# Patient Record
Sex: FEMALE | Race: WHITE | NOT HISPANIC OR LATINO | URBAN - METROPOLITAN AREA
[De-identification: names, ages, dates, MRNs, and addresses within clinical notes are randomized per-mention and may not be internally consistent; named-entity substitution may affect disease eponyms.]

---

## 2017-08-06 RX ORDER — CARBAMIDE PEROXIDE 81.86 MG/ML
0 SOLUTION/ DROPS AURICULAR (OTIC)
Qty: 0 | Refills: 0 | COMMUNITY
Start: 2017-08-06

## 2017-08-07 ENCOUNTER — INPATIENT (INPATIENT)
Facility: HOSPITAL | Age: 57
LOS: 0 days | Discharge: HOME | DRG: 310 | End: 2017-08-08
Attending: INTERNAL MEDICINE | Admitting: INTERNAL MEDICINE
Payer: MEDICAID

## 2017-08-07 VITALS
HEART RATE: 106 BPM | DIASTOLIC BLOOD PRESSURE: 82 MMHG | OXYGEN SATURATION: 98 % | WEIGHT: 253.53 LBS | SYSTOLIC BLOOD PRESSURE: 146 MMHG | RESPIRATION RATE: 16 BRPM

## 2017-08-07 DIAGNOSIS — I48.91 UNSPECIFIED ATRIAL FIBRILLATION: ICD-10-CM

## 2017-08-07 LAB
ALBUMIN SERPL ELPH-MCNC: 4.6 G/DL — SIGNIFICANT CHANGE UP (ref 3.3–5)
ALP SERPL-CCNC: 61 U/L — SIGNIFICANT CHANGE UP (ref 40–120)
ALT FLD-CCNC: 33 U/L RC — SIGNIFICANT CHANGE UP (ref 10–45)
ANION GAP SERPL CALC-SCNC: 15 MMOL/L — SIGNIFICANT CHANGE UP (ref 5–17)
APTT BLD: 32.1 SEC — SIGNIFICANT CHANGE UP (ref 27.5–37.4)
AST SERPL-CCNC: 28 U/L — SIGNIFICANT CHANGE UP (ref 10–40)
BASOPHILS # BLD AUTO: 0 K/UL — SIGNIFICANT CHANGE UP (ref 0–0.2)
BASOPHILS NFR BLD AUTO: 0.3 % — SIGNIFICANT CHANGE UP (ref 0–2)
BILIRUB SERPL-MCNC: 0.9 MG/DL — SIGNIFICANT CHANGE UP (ref 0.2–1.2)
BUN SERPL-MCNC: 7 MG/DL — SIGNIFICANT CHANGE UP (ref 7–23)
CALCIUM SERPL-MCNC: 9.1 MG/DL — SIGNIFICANT CHANGE UP (ref 8.4–10.5)
CHLORIDE SERPL-SCNC: 102 MMOL/L — SIGNIFICANT CHANGE UP (ref 96–108)
CK MB BLD-MCNC: 2.5 % — SIGNIFICANT CHANGE UP (ref 0–3.5)
CK MB CFR SERPL CALC: 1.4 NG/ML — SIGNIFICANT CHANGE UP (ref 0–3.8)
CK MB CFR SERPL CALC: 1.5 NG/ML — SIGNIFICANT CHANGE UP (ref 0–3.8)
CK SERPL-CCNC: 57 U/L — SIGNIFICANT CHANGE UP (ref 25–170)
CK SERPL-CCNC: 65 U/L — SIGNIFICANT CHANGE UP (ref 25–170)
CO2 SERPL-SCNC: 25 MMOL/L — SIGNIFICANT CHANGE UP (ref 22–31)
CREAT SERPL-MCNC: 0.74 MG/DL — SIGNIFICANT CHANGE UP (ref 0.5–1.3)
EOSINOPHIL # BLD AUTO: 0 K/UL — SIGNIFICANT CHANGE UP (ref 0–0.5)
EOSINOPHIL NFR BLD AUTO: 0.2 % — SIGNIFICANT CHANGE UP (ref 0–6)
GLUCOSE SERPL-MCNC: 156 MG/DL — HIGH (ref 70–99)
HCT VFR BLD CALC: 46 % — HIGH (ref 34.5–45)
HGB BLD-MCNC: 16.3 G/DL — HIGH (ref 11.5–15.5)
INR BLD: 1.05 RATIO — SIGNIFICANT CHANGE UP (ref 0.88–1.16)
LYMPHOCYTES # BLD AUTO: 1.5 K/UL — SIGNIFICANT CHANGE UP (ref 1–3.3)
LYMPHOCYTES # BLD AUTO: 15.5 % — SIGNIFICANT CHANGE UP (ref 13–44)
MCHC RBC-ENTMCNC: 31.8 PG — SIGNIFICANT CHANGE UP (ref 27–34)
MCHC RBC-ENTMCNC: 35.5 GM/DL — SIGNIFICANT CHANGE UP (ref 32–36)
MCV RBC AUTO: 89.4 FL — SIGNIFICANT CHANGE UP (ref 80–100)
MONOCYTES # BLD AUTO: 0.5 K/UL — SIGNIFICANT CHANGE UP (ref 0–0.9)
MONOCYTES NFR BLD AUTO: 5.2 % — SIGNIFICANT CHANGE UP (ref 2–14)
NEUTROPHILS # BLD AUTO: 7.5 K/UL — HIGH (ref 1.8–7.4)
NEUTROPHILS NFR BLD AUTO: 78.8 % — HIGH (ref 43–77)
PLATELET # BLD AUTO: 233 K/UL — SIGNIFICANT CHANGE UP (ref 150–400)
POTASSIUM SERPL-MCNC: 3.9 MMOL/L — SIGNIFICANT CHANGE UP (ref 3.5–5.3)
POTASSIUM SERPL-SCNC: 3.9 MMOL/L — SIGNIFICANT CHANGE UP (ref 3.5–5.3)
PROT SERPL-MCNC: 8.2 G/DL — SIGNIFICANT CHANGE UP (ref 6–8.3)
PROTHROM AB SERPL-ACNC: 11.4 SEC — SIGNIFICANT CHANGE UP (ref 9.8–12.7)
RBC # BLD: 5.14 M/UL — SIGNIFICANT CHANGE UP (ref 3.8–5.2)
RBC # FLD: 11.4 % — SIGNIFICANT CHANGE UP (ref 10.3–14.5)
SODIUM SERPL-SCNC: 142 MMOL/L — SIGNIFICANT CHANGE UP (ref 135–145)
TROPONIN T SERPL-MCNC: <0.01 NG/ML — SIGNIFICANT CHANGE UP (ref 0–0.06)
TROPONIN T SERPL-MCNC: <0.01 NG/ML — SIGNIFICANT CHANGE UP (ref 0–0.06)
TSH SERPL-MCNC: 1.63 UIU/ML — SIGNIFICANT CHANGE UP (ref 0.27–4.2)
WBC # BLD: 9.5 K/UL — SIGNIFICANT CHANGE UP (ref 3.8–10.5)
WBC # FLD AUTO: 9.5 K/UL — SIGNIFICANT CHANGE UP (ref 3.8–10.5)

## 2017-08-07 PROCEDURE — 99285 EMERGENCY DEPT VISIT HI MDM: CPT

## 2017-08-07 RX ORDER — DEXTROSE 50 % IN WATER 50 %
1 SYRINGE (ML) INTRAVENOUS ONCE
Qty: 0 | Refills: 0 | Status: DISCONTINUED | OUTPATIENT
Start: 2017-08-07 | End: 2017-08-08

## 2017-08-07 RX ORDER — HEPARIN SODIUM 5000 [USP'U]/ML
INJECTION INTRAVENOUS; SUBCUTANEOUS
Qty: 25000 | Refills: 0 | Status: DISCONTINUED | OUTPATIENT
Start: 2017-08-07 | End: 2017-08-08

## 2017-08-07 RX ORDER — GLUCAGON INJECTION, SOLUTION 0.5 MG/.1ML
1 INJECTION, SOLUTION SUBCUTANEOUS ONCE
Qty: 0 | Refills: 0 | Status: DISCONTINUED | OUTPATIENT
Start: 2017-08-07 | End: 2017-08-08

## 2017-08-07 RX ORDER — HEPARIN SODIUM 5000 [USP'U]/ML
5000 INJECTION INTRAVENOUS; SUBCUTANEOUS ONCE
Qty: 0 | Refills: 0 | Status: DISCONTINUED | OUTPATIENT
Start: 2017-08-07 | End: 2017-08-07

## 2017-08-07 RX ORDER — SODIUM CHLORIDE 9 MG/ML
1000 INJECTION, SOLUTION INTRAVENOUS
Qty: 0 | Refills: 0 | Status: DISCONTINUED | OUTPATIENT
Start: 2017-08-07 | End: 2017-08-08

## 2017-08-07 RX ORDER — HEPARIN SODIUM 5000 [USP'U]/ML
4500 INJECTION INTRAVENOUS; SUBCUTANEOUS EVERY 6 HOURS
Qty: 0 | Refills: 0 | Status: DISCONTINUED | OUTPATIENT
Start: 2017-08-07 | End: 2017-08-08

## 2017-08-07 RX ORDER — DEXTROSE 50 % IN WATER 50 %
25 SYRINGE (ML) INTRAVENOUS ONCE
Qty: 0 | Refills: 0 | Status: DISCONTINUED | OUTPATIENT
Start: 2017-08-07 | End: 2017-08-08

## 2017-08-07 RX ORDER — INSULIN LISPRO 100/ML
VIAL (ML) SUBCUTANEOUS
Qty: 0 | Refills: 0 | Status: DISCONTINUED | OUTPATIENT
Start: 2017-08-07 | End: 2017-08-08

## 2017-08-07 RX ORDER — DEXTROSE 50 % IN WATER 50 %
12.5 SYRINGE (ML) INTRAVENOUS ONCE
Qty: 0 | Refills: 0 | Status: DISCONTINUED | OUTPATIENT
Start: 2017-08-07 | End: 2017-08-08

## 2017-08-07 RX ORDER — HEPARIN SODIUM 5000 [USP'U]/ML
INJECTION INTRAVENOUS; SUBCUTANEOUS
Qty: 25000 | Refills: 0 | Status: DISCONTINUED | OUTPATIENT
Start: 2017-08-07 | End: 2017-08-07

## 2017-08-07 RX ORDER — HEPARIN SODIUM 5000 [USP'U]/ML
9000 INJECTION INTRAVENOUS; SUBCUTANEOUS ONCE
Qty: 0 | Refills: 0 | Status: COMPLETED | OUTPATIENT
Start: 2017-08-07 | End: 2017-08-07

## 2017-08-07 RX ORDER — HEPARIN SODIUM 5000 [USP'U]/ML
9000 INJECTION INTRAVENOUS; SUBCUTANEOUS EVERY 6 HOURS
Qty: 0 | Refills: 0 | Status: DISCONTINUED | OUTPATIENT
Start: 2017-08-07 | End: 2017-08-08

## 2017-08-07 RX ORDER — ASPIRIN/CALCIUM CARB/MAGNESIUM 324 MG
162 TABLET ORAL ONCE
Qty: 0 | Refills: 0 | Status: DISCONTINUED | OUTPATIENT
Start: 2017-08-07 | End: 2017-08-07

## 2017-08-07 RX ORDER — INSULIN LISPRO 100/ML
VIAL (ML) SUBCUTANEOUS AT BEDTIME
Qty: 0 | Refills: 0 | Status: DISCONTINUED | OUTPATIENT
Start: 2017-08-07 | End: 2017-08-08

## 2017-08-07 RX ORDER — ASPIRIN/CALCIUM CARB/MAGNESIUM 324 MG
162 TABLET ORAL ONCE
Qty: 0 | Refills: 0 | Status: COMPLETED | OUTPATIENT
Start: 2017-08-07 | End: 2017-08-07

## 2017-08-07 RX ORDER — HEPARIN SODIUM 5000 [USP'U]/ML
6000 INJECTION INTRAVENOUS; SUBCUTANEOUS EVERY 6 HOURS
Qty: 0 | Refills: 0 | Status: DISCONTINUED | OUTPATIENT
Start: 2017-08-07 | End: 2017-08-07

## 2017-08-07 RX ORDER — LOSARTAN POTASSIUM 100 MG/1
25 TABLET, FILM COATED ORAL DAILY
Qty: 0 | Refills: 0 | Status: DISCONTINUED | OUTPATIENT
Start: 2017-08-07 | End: 2017-08-08

## 2017-08-07 RX ADMIN — Medication 162 MILLIGRAM(S): at 14:40

## 2017-08-07 RX ADMIN — HEPARIN SODIUM 9000 UNIT(S): 5000 INJECTION INTRAVENOUS; SUBCUTANEOUS at 19:55

## 2017-08-07 RX ADMIN — HEPARIN SODIUM 2000 UNIT(S)/HR: 5000 INJECTION INTRAVENOUS; SUBCUTANEOUS at 19:56

## 2017-08-07 NOTE — H&P ADULT - NSHPLABSRESULTS_GEN_ALL_CORE
16.3   9.5   )-----------( 233      ( 07 Aug 2017 14:22 )             46.0       08-07    142  |  102  |  7   ----------------------------<  156<H>  3.9   |  25  |  0.74    Ca    9.1      07 Aug 2017 14:22    TPro  8.2  /  Alb  4.6  /  TBili  0.9  /  DBili  x   /  AST  28  /  ALT  33  /  AlkPhos  61  08-07                  PT/INR - ( 07 Aug 2017 17:23 )   PT: 11.4 sec;   INR: 1.05 ratio         PTT - ( 07 Aug 2017 17:23 )  PTT:32.1 sec    Lactate Trend      CARDIAC MARKERS ( 07 Aug 2017 17:29 )  x     / <0.01 ng/mL / 57 U/L / x     / 1.4 ng/mL  CARDIAC MARKERS ( 07 Aug 2017 14:22 )  x     / <0.01 ng/mL / 65 U/L / x     / 1.5 ng/mL        CAPILLARY BLOOD GLUCOSE  163 (07 Aug 2017 21:28)            EKG SR Tinv iii/F                          16.3   9.5   )-----------( 233      ( 07 Aug 2017 14:22 )             46.0       08-07    142  |  102  |  7   ----------------------------<  156<H>  3.9   |  25  |  0.74    Ca    9.1      07 Aug 2017 14:22    TPro  8.2  /  Alb  4.6  /  TBili  0.9  /  DBili  x   /  AST  28  /  ALT  33  /  AlkPhos  61  08-07                  PT/INR - ( 07 Aug 2017 17:23 )   PT: 11.4 sec;   INR: 1.05 ratio         PTT - ( 07 Aug 2017 17:23 )  PTT:32.1 sec    Lactate Trend      CARDIAC MARKERS ( 07 Aug 2017 17:29 )  x     / <0.01 ng/mL / 57 U/L / x     / 1.4 ng/mL  CARDIAC MARKERS ( 07 Aug 2017 14:22 )  x     / <0.01 ng/mL / 65 U/L / x     / 1.5 ng/mL        CAPILLARY BLOOD GLUCOSE  163 (07 Aug 2017 21:28)

## 2017-08-07 NOTE — CONSULT NOTE ADULT - SUBJECTIVE AND OBJECTIVE BOX
CHIEF COMPLAINT: afib    HISTORY OF PRESENT ILLNESS:  This is a pleasant 57 f with htn, Dm, went to her pmd and card office found to be in rapid afib  she denies any chest pain, sob, palpitation, dizziness or syncope.     PAST MEDICAL & SURGICAL HISTORY:  DM (diabetes mellitus)          MEDICATIONS:      reviewed             FAMILY HISTORY:      Non-contributory    SOCIAL HISTORY:    No tobacco, drugs or etoh    Allergies    cortisone (Swelling)  predniSONE (Swelling)  tetracycline (Swelling)    Intolerances    	    REVIEW OF SYSTEMS:  as above  The rest of the 14 points ROS reviewed and except above they are unremarkable.        PHYSICAL EXAM:  T(C): 36.6 (08-07-17 @ 17:28), Max: 36.9 (08-07-17 @ 13:34)  HR: 78 (08-07-17 @ 17:28) (78 - 127)  BP: 158/78 (08-07-17 @ 17:28) (133/79 - 158/78)  RR: 18 (08-07-17 @ 17:28) (16 - 18)  SpO2: 96% (08-07-17 @ 17:28) (95% - 98%)  Wt(kg): --  I&O's Summary      Appearance: Normal	  HEENT:   Normal oral mucosa, PERRL, EOMI	  Cardiovascular: Normal S1 S2,    Murmur:   Neck: JVP normal  Respiratory: Lungs clear to auscultation  Gastrointestinal:  Soft, Non-tender, + BS	  Skin: normal   Neuro: No gross deficits.   Psychiatry:  Mood & affect appropriate  Ext: No edema    TELEMETRY: 	    ECG:  	  RADIOLOGY:  OTHER: 	  	  LABS:	 	    CARDIAC MARKERS:  Troponin T, Serum: <0.01 ng/mL (08-07 @ 17:29)  Troponin T, Serum: <0.01 ng/mL (08-07 @ 14:22)                                  16.3   9.5   )-----------( 233      ( 07 Aug 2017 14:22 )             46.0     08-07    142  |  102  |  7   ----------------------------<  156<H>  3.9   |  25  |  0.74    Ca    9.1      07 Aug 2017 14:22    TPro  8.2  /  Alb  4.6  /  TBili  0.9  /  DBili  x   /  AST  28  /  ALT  33  /  AlkPhos  61  08-07    proBNP:   Lipid Profile:   HgA1c:   TSH:

## 2017-08-07 NOTE — ED PROVIDER NOTE - OBJECTIVE STATEMENT
57 year old woman PMH DM p/w rapid afib. Was at PMD for routine appt and noted to have rapid irregular HR by PMD. Was tachy 140-160s with EMS who gave diltiazem 20 mg iv with improvement. Pt says that she has been doing a lot of cardio lately but otherwise cannot identify any inciting event. Denies fevers, chest pain, shortness of breath. No VTE risk factors.

## 2017-08-07 NOTE — CONSULT NOTE ADULT - ASSESSMENT
Afib  start cardizem 30 q6   The calculated [id number] score is 3 , will start a/c. the risk and benefit of a/c discussed with pt and she agrees and wants to go with NOAC  will start eliquis  check echo  check TSH    HTN  cont outpt meds  hold norvasc    DM  Monitor finger stick. Insulin coverage. Diabetic education and Diabetic diet. Consider nutrition consultation.

## 2017-08-07 NOTE — H&P ADULT - NSHPPHYSICALEXAM_GEN_ALL_CORE
PHYSICAL EXAMINATION:  Vital Signs Last 24 Hrs  T(C): 36.9 (07 Aug 2017 20:08), Max: 37.2 (07 Aug 2017 19:53)  T(F): 98.5 (07 Aug 2017 20:08), Max: 98.9 (07 Aug 2017 19:53)  HR: 73 (07 Aug 2017 20:08) (73 - 127)  BP: 149/77 (07 Aug 2017 20:08) (133/79 - 158/78)  BP(mean): --  RR: 18 (07 Aug 2017 20:08) (16 - 18)  SpO2: 95% (07 Aug 2017 20:08) (95% - 98%)  CAPILLARY BLOOD GLUCOSE  163 (07 Aug 2017 21:28)          GENERAL: NAD, well-groomed, well-developed  HEAD:  atraumatic, normocephalic  EYES: sclera anicteric  ENMT: mucous membranes moist  NECK: supple, No JVD  CHEST/LUNG: clear to auscultation bilaterally; no rales, rhonchi, or wheezing b/l  HEART: normal S1, S2  ABDOMEN: BS+, soft, ND, NT   EXTREMITIES:  pulses palpable; no clubbing, cyanosis, or edema b/l LEs  NEURO: awake, alert, interactive; moves all extremities  SKIN: no rashes or lesions

## 2017-08-07 NOTE — ED PROVIDER NOTE - ATTENDING CONTRIBUTION TO CARE
Attending MD Raygoza.  Agree with above.  Pt is a 57 yr old who has hx of diabetes, had new onset Afib found in doctor’s office [place] [facility].  Pt was there for a regular appt and was found to be in afib with RVR.  No hx of afib.  Pt txed with 20 mg diltiazem with improvement in rate en route. Attending MD Raygoza.  Agree with above.  Pt is a 57 yr old who has hx of diabetes, had new onset Afib found in doctor’s office [place].  Pt was there for a regular appt and was found to be in afib with RVR.  No hx of afib.  Pt txed with 20 mg diltiazem with improvement in rate en route.  Diltiazem redosed IV and PO loaded with improvement in rate and conversion to sinus rhythm.  Pt has no CP/headache/abdominal pain.  Remains neurologically afocal.  Stable for admission.

## 2017-08-07 NOTE — H&P ADULT - HISTORY OF PRESENT ILLNESS
The patient is a 57y Female complaining of	   57 year old woman PMH DM p/w rapid afib. Was at PMD for routine appt and noted to have rapid irregular HR by PMD. Was tachy 140-160s with EMS who gave diltiazem 20 mg iv with improvement. Pt says that she has been doing a lot of cardio lately but otherwise cannot identify any inciting event. Denies fevers, chest pain, shortness of breath. No VTE risk factors.

## 2017-08-07 NOTE — ED ADULT NURSE NOTE - OBJECTIVE STATEMENT
56 yo F presents to ED A+Ox3 via EMS from her PMD office for rapid heart rate. Pt. states she went to PMD for her routine 6 month check up, was noted to have a rapid heart rate. As per EMS, "she was in rapid atrial fibrillation, heart rate in the 160s." EMS reports given 20mg of cardizem prior to ED arrival. Pt. arrives, placed on CM-heart rate noted to be in 120s. Pt. denies chest pain, palpitations, SOB, back pain, N/V, fever, chills, cough. Lungs CTA, breathing unlabored on RA. Skin warm pink and dry. Abdomen soft. No swelling noted to lower extremities. Family at bedside. Pt. denies any past cardiac history. Comfort and safety maintained.

## 2017-08-07 NOTE — H&P ADULT - ASSESSMENT
57f with new A. Fib  AC with Heparin for now  rate control  cardiology evaluation Dr. Amanda/Evelio  Diabetes control  Echo  Further action as per clinical course   Dre Pride MD pager [phone]

## 2017-08-07 NOTE — ED PROVIDER NOTE - PROGRESS NOTE DETAILS
d/w pt's cardiologist [facility]. will anticoagulate with heparin

## 2017-08-07 NOTE — ED ADULT NURSE REASSESSMENT NOTE - NS ED NURSE REASSESS COMMENT FT1
Pt. on CM, converted from atrial fibrillation to NSR., MD aware, repeat EKG done as ordered.
Pt is in no current distress. Comfort and safety provided.

## 2017-08-07 NOTE — ED PROVIDER NOTE - MEDICAL DECISION MAKING DETAILS
57 F new onset afib unknown acuity broke into nsr with cardizem load. will admit for work-up, anticoagulation.

## 2017-08-08 VITALS
HEART RATE: 71 BPM | RESPIRATION RATE: 18 BRPM | OXYGEN SATURATION: 97 % | SYSTOLIC BLOOD PRESSURE: 132 MMHG | TEMPERATURE: 98 F | DIASTOLIC BLOOD PRESSURE: 71 MMHG

## 2017-08-08 LAB
APTT BLD: 137.7 SEC — CRITICAL HIGH (ref 27.5–37.4)
APTT BLD: > 200 SEC (ref 27.5–37.4)
HBA1C BLD-MCNC: 6.3 % — HIGH (ref 4–5.6)
HCT VFR BLD CALC: 43.7 % — SIGNIFICANT CHANGE UP (ref 34.5–45)
HGB BLD-MCNC: 15.3 G/DL — SIGNIFICANT CHANGE UP (ref 11.5–15.5)
MCHC RBC-ENTMCNC: 31.4 PG — SIGNIFICANT CHANGE UP (ref 27–34)
MCHC RBC-ENTMCNC: 34.9 GM/DL — SIGNIFICANT CHANGE UP (ref 32–36)
MCV RBC AUTO: 89.8 FL — SIGNIFICANT CHANGE UP (ref 80–100)
PLATELET # BLD AUTO: 234 K/UL — SIGNIFICANT CHANGE UP (ref 150–400)
RBC # BLD: 4.86 M/UL — SIGNIFICANT CHANGE UP (ref 3.8–5.2)
RBC # FLD: 11.5 % — SIGNIFICANT CHANGE UP (ref 10.3–14.5)
WBC # BLD: 9.7 K/UL — SIGNIFICANT CHANGE UP (ref 3.8–10.5)
WBC # FLD AUTO: 9.7 K/UL — SIGNIFICANT CHANGE UP (ref 3.8–10.5)

## 2017-08-08 PROCEDURE — 93306 TTE W/DOPPLER COMPLETE: CPT | Mod: 26

## 2017-08-08 RX ORDER — APIXABAN 2.5 MG/1
1 TABLET, FILM COATED ORAL
Qty: 0 | Refills: 0 | COMMUNITY
Start: 2017-08-08

## 2017-08-08 RX ORDER — DILTIAZEM HCL 120 MG
1 CAPSULE, EXT RELEASE 24 HR ORAL
Qty: 30 | Refills: 0 | OUTPATIENT
Start: 2017-08-08 | End: 2017-09-07

## 2017-08-08 RX ORDER — AMLODIPINE BESYLATE 2.5 MG/1
1 TABLET ORAL
Qty: 0 | Refills: 0 | COMMUNITY

## 2017-08-08 RX ORDER — APIXABAN 2.5 MG/1
5 TABLET, FILM COATED ORAL EVERY 12 HOURS
Qty: 0 | Refills: 0 | Status: DISCONTINUED | OUTPATIENT
Start: 2017-08-08 | End: 2017-08-08

## 2017-08-08 RX ORDER — APIXABAN 2.5 MG/1
1 TABLET, FILM COATED ORAL
Qty: 60 | Refills: 0 | OUTPATIENT
Start: 2017-08-08 | End: 2017-09-07

## 2017-08-08 RX ADMIN — HEPARIN SODIUM 0 UNIT(S)/HR: 5000 INJECTION INTRAVENOUS; SUBCUTANEOUS at 12:29

## 2017-08-08 RX ADMIN — HEPARIN SODIUM 1600 UNIT(S)/HR: 5000 INJECTION INTRAVENOUS; SUBCUTANEOUS at 04:40

## 2017-08-08 RX ADMIN — APIXABAN 5 MILLIGRAM(S): 2.5 TABLET, FILM COATED ORAL at 13:32

## 2017-08-08 RX ADMIN — HEPARIN SODIUM 0 UNIT(S)/HR: 5000 INJECTION INTRAVENOUS; SUBCUTANEOUS at 03:36

## 2017-08-08 RX ADMIN — LOSARTAN POTASSIUM 25 MILLIGRAM(S): 100 TABLET, FILM COATED ORAL at 05:28

## 2017-08-08 NOTE — DISCHARGE NOTE ADULT - PATIENT PORTAL LINK FT
“You can access the [facility] Patient Portal, offered by [facility], by registering with the following website: [email or web address]”

## 2017-08-08 NOTE — PROGRESS NOTE ADULT - SUBJECTIVE AND OBJECTIVE BOX
Subjective: Patient seen and examined. No new events except as noted.     SUBJECTIVE/ROS:    No chest pain, or sob.      MEDICATIONS:  MEDICATIONS  (STANDING):  heparin  Infusion.  Unit(s)/Hr (20 mL/Hr) IV Continuous <Continuous>  insulin lispro (HumaLOG) corrective regimen sliding scale   SubCutaneous three times a day before meals  insulin lispro (HumaLOG) corrective regimen sliding scale   SubCutaneous at bedtime  dextrose 5%. 1000 milliLiter(s) (50 mL/Hr) IV Continuous <Continuous>  dextrose 50% Injectable 12.5 Gram(s) IV Push once  dextrose 50% Injectable 25 Gram(s) IV Push once  dextrose 50% Injectable 25 Gram(s) IV Push once  diltiazem    Tablet 30 milliGRAM(s) Oral every 6 hours  losartan 25 milliGRAM(s) Oral daily      PHYSICAL EXAM:  T(C): 37 (08-08-17 @ 05:10), Max: 37.2 (08-07-17 @ 19:53)  HR: 70 (08-08-17 @ 05:10) (65 - 127)  BP: 154/71 (08-08-17 @ 05:10) (130/70 - 158/78)  RR: 18 (08-08-17 @ 05:10) (16 - 18)  SpO2: 98% (08-08-17 @ 05:10) (95% - 98%)  Wt(kg): --  I&O's Summary      Weight (kg): 113.6 (08-07 @ 18:42)    Appearance: Normal	  HEENT:   Normal oral mucosa, PERRL, EOMI	  Cardiovascular: Normal S1 S2,    Murmur:   Neck: JVP normal  Respiratory: Lungs clear to auscultation  Gastrointestinal:  Soft, Non-tender, + BS	  Skin: normal   Neuro: No gross deficits.   Psychiatry:  Mood & affect appropriate  Ext: No edema        LABS:    CARDIAC MARKERS:  CARDIAC MARKERS ( 07 Aug 2017 17:29 )  x     / <0.01 ng/mL / 57 U/L / x     / 1.4 ng/mL  CARDIAC MARKERS ( 07 Aug 2017 14:22 )  x     / <0.01 ng/mL / 65 U/L / x     / 1.5 ng/mL                                15.3   9.7   )-----------( 234      ( 08 Aug 2017 01:46 )             43.7     08-07    142  |  102  |  7   ----------------------------<  156<H>  3.9   |  25  |  0.74    Ca    9.1      07 Aug 2017 14:22    TPro  8.2  /  Alb  4.6  /  TBili  0.9  /  DBili  x   /  AST  28  /  ALT  33  /  AlkPhos  61  08-07    proBNP:   Lipid Profile:   HgA1c:   TSH: Thyroid Stimulating Hormone, Serum: 1.63 uIU/mL (08-07 @ 16:07)            TELEMETRY: 	    ECG:  	  RADIOLOGY:   DIAGNOSTIC TESTING:  Echocardiogram:  Catheterization:  Stress Test:    OTHER:

## 2017-08-08 NOTE — DISCHARGE NOTE ADULT - HOSPITAL COURSE
To be completed by attending 57 year old woman PMH DM p/w rapid afib. Was at PMD for routine appt and noted to have rapid irregular HR by PMD. Was tachy 140-160s with EMS who gave diltiazem 20 mg iv with improvement. Pt says that she has been doing a lot of cardio lately but otherwise cannot identify any inciting event. 8/7  AFB RVR- received Cardizem 20 mg IV and 60 mg PO- broke into nsr         Started on Heparin gtts    8/8 discharge on cardizem /Eliquis  Dx: AFIB RVR ( new onset)

## 2017-08-08 NOTE — DISCHARGE NOTE ADULT - CARE PLAN
Principal Discharge DX:	New onset atrial fibrillation  Goal:	Ongoing  Instructions for follow-up, activity and diet:	Atrial fibrillation is the most common heart rhythm problem & has the risk of stroke & heart attack  It helps if you control your blood pressure, not drink more than 1-2 alcohol drinks per day, cut down on caffeine, getting treatment for over active thyroid gland, & getting exercise  Call your doctor if you feel your heart racing or beating unusually, chest tightness or pain, lightheaded, faint, shortness of breath especially with exercise  It is important to take your heart medication as prescribed  You may be on anticoagulation which is very important to take as directed - you may need blood work to monitor drug levels  Secondary Diagnosis:	Essential hypertension  Instructions for follow-up, activity and diet:	Low salt diet  Activity as tolerated.  Take all medication as prescribed.  Follow up with your medical doctor for routine blood pressure monitoring at your next visit.  Notify your doctor if you have any of the following symptoms:   Dizziness, Lightheadedness, Blurry vision, Headache, Chest pain, Shortness of breath  Secondary Diagnosis:	Diabetes mellitus type 2, noninsulin dependent  Instructions for follow-up, activity and diet:	HgA1C this admission.  Make sure you get your HgA1c checked every three months.  If you take oral diabetes medications, check your blood glucose two times a day.  If you take insulin, check your blood glucose before meals and at bedtime.  It's important not to skip any meals.  Keep a log of your blood glucose results and always take it with you to your doctor appointments.  Keep a list of your current medications including injectables and over the counter medications and bring this medication list with you to all your doctor appointments.  If you have not seen your opthalmologist this year call for appointment.  Check your feet daily for redness, sores, or openings. Do not self treat. If no improvement in two days call your primary care physician for an appointment.  Low blood sugar (hypoglycemia) is a blood sugar below 70mg/dl. Check your blood sugar if you feel signs/symptoms of hypoglycemia. If your blood sugar is below 70 take 15 grams of carbohydrates (ex 4 oz of apple juice, 3-4 glucose tablets, or 4-6 oz of regular soda) wait 15 minutes and repeat blood sugar to make sure it comes up above 70.  If your blood sugar is above 70 and you are due for a meal, have a meal.  If you are not due for a meal have a snack.  This snack helps keeps your blood sugar at a safe range.  Goal:	Eliquis  Instructions for follow-up, activity and diet:	Eliquis is used  for non-valvular Atrial Fibrillation to thin the blood  to prevent clots from forming .  Take this medication twice as prescribed by your health care provider.  Take this medication with food to prevent upset stomach.  If you miss a dose call your health care provider or pharmacist right away.  Tell your doctor you use this drug before you have a spinal or epidural procedure  Tell dentists, surgeon, and other doctors that you use this drug.  You may bleed more easily.  Be careful and avoid injury.  Use a soft toothbrush and an electric razor.

## 2017-08-08 NOTE — PROGRESS NOTE ADULT - ASSESSMENT
57f with new A. Fib  AC with Heparin for now/ chamge to Eliquis as per cardiology recomandations. risks/benefits/alternatives/contraindications explained and undesrtood by patient   rate control  cardiology follow Dr. mAanda/Evelio Cleared for Discharge.  Diabetes control  DC home. Follow with PMD/cardiology in 2-3 days. QA     Dre Pride MD pager [phone]

## 2017-08-08 NOTE — DISCHARGE NOTE ADULT - PLAN OF CARE
Ongoing Low salt diet  Activity as tolerated.  Take all medication as prescribed.  Follow up with your medical doctor for routine blood pressure monitoring at your next visit.  Notify your doctor if you have any of the following symptoms:   Dizziness, Lightheadedness, Blurry vision, Headache, Chest pain, Shortness of breath HgA1C this admission.  Make sure you get your HgA1c checked every three months.  If you take oral diabetes medications, check your blood glucose two times a day.  If you take insulin, check your blood glucose before meals and at bedtime.  It's important not to skip any meals.  Keep a log of your blood glucose results and always take it with you to your doctor appointments.  Keep a list of your current medications including injectables and over the counter medications and bring this medication list with you to all your doctor appointments.  If you have not seen your opthalmologist this year call for appointment.  Check your feet daily for redness, sores, or openings. Do not self treat. If no improvement in two days call your primary care physician for an appointment.  Low blood sugar (hypoglycemia) is a blood sugar below 70mg/dl. Check your blood sugar if you feel signs/symptoms of hypoglycemia. If your blood sugar is below 70 take 15 grams of carbohydrates (ex 4 oz of apple juice, 3-4 glucose tablets, or 4-6 oz of regular soda) wait 15 minutes and repeat blood sugar to make sure it comes up above 70.  If your blood sugar is above 70 and you are due for a meal, have a meal.  If you are not due for a meal have a snack.  This snack helps keeps your blood sugar at a safe range. Atrial fibrillation is the most common heart rhythm problem & has the risk of stroke & heart attack  It helps if you control your blood pressure, not drink more than 1-2 alcohol drinks per day, cut down on caffeine, getting treatment for over active thyroid gland, & getting exercise  Call your doctor if you feel your heart racing or beating unusually, chest tightness or pain, lightheaded, faint, shortness of breath especially with exercise  It is important to take your heart medication as prescribed  You may be on anticoagulation which is very important to take as directed - you may need blood work to monitor drug levels Eliquis Eliquis is used  for non-valvular Atrial Fibrillation to thin the blood  to prevent clots from forming .  Take this medication twice as prescribed by your health care provider.  Take this medication with food to prevent upset stomach.  If you miss a dose call your health care provider or pharmacist right away.  Tell your doctor you use this drug before you have a spinal or epidural procedure  Tell dentists, surgeon, and other doctors that you use this drug.  You may bleed more easily.  Be careful and avoid injury.  Use a soft toothbrush and an electric razor.

## 2017-08-08 NOTE — DISCHARGE NOTE ADULT - MEDICATION SUMMARY - MEDICATIONS TO TAKE
I will START or STAY ON the medications listed below when I get home from the hospital:    losartan 25 mg oral tablet  -- 1 tab(s) by mouth once a day  -- Indication: For Essential hypertension    Cardizem  mg/24 hours oral capsule, extended release  -- 1 cap(s) by mouth once a day  -- Indication: For Atrial fibrillation/rate control    apixaban 5 mg oral tablet  -- 1 tab(s) by mouth every 12 hours  -- Indication: For Atrial fibrillation/blood thinner    metFORMIN 500 mg oral tablet  -- 1 tab(s) by mouth 2 times a day  -- Indication: For Diabetes mellitus type 2, noninsulin dependent    Onglyza 2.5 mg oral tablet  -- 1 tab(s) by mouth once a day  -- Indication: For Diabetes mellitus type 2, noninsulin dependent    Debrox 6.5% otic solution  -- drop(s) to right ear 2 times a day for 2 weeks   -- Indication: For Ear wax removal    Vitamin B-12 1000 mcg oral tablet  -- 1 tab(s) by mouth once a day  -- Indication: For vitamin B supplement     Vitamin D3 1000 intl units oral tablet  -- 1 tab(s) by mouth once a day  -- Indication: For Vitamin d supplement

## 2017-08-08 NOTE — PROVIDER CONTACT NOTE (CRITICAL VALUE NOTIFICATION) - ACTION/TREATMENT ORDERED:
FELICIANO simon will continue to follow heparin nomogram, will continue to monitor pt.
will follow nomagram

## 2017-08-08 NOTE — CHART NOTE - NSCHARTNOTEFT_GEN_A_CORE
Received call from RN APTT  at 137.3  currently Heparin drip is at 16 _ml/hr  therefore will hold Heparin drip for  1 hour and restart Heparin Drip at  13 ml/hr   repeat PT/APTT in 6 hours. No signs of active bleeding noted.   Diana Hull DNP- C  [phone]

## 2017-08-08 NOTE — DISCHARGE NOTE ADULT - CARE PROVIDER_API CALL
Jakob Amanda (MD), Cardiovascular Disease  [address] [place] [address]  Phone: [phone]  Fax: [phone]

## 2017-08-08 NOTE — PROGRESS NOTE ADULT - ASSESSMENT
Afib  in sinus, on cardizem 30 q6   The calculated [id number] score is 3 , will start a/c. the risk and benefit of a/c discussed with pt and she agrees and wants to go with NOAC  may switch to eliquis from heparin  check echo  check TSH    HTN  cont outpt meds  hold norvasc and uptitirate losartan    DM  Monitor finger stick. Insulin coverage. Diabetic education and Diabetic diet. Consider nutrition consultation.

## 2017-08-08 NOTE — PROGRESS NOTE ADULT - SUBJECTIVE AND OBJECTIVE BOX
Patient is a 57y old  Female who presents with a chief complaint of new A. Fib (08 Aug 2017 12:44)      SUBJECTIVE / OVERNIGHT EVENTS: Comfortable without new complaints.   Review of Systems  chest pain no  palpitations no  sob no  nausea no  headache no    MEDICATIONS  (STANDING):  insulin lispro (HumaLOG) corrective regimen sliding scale   SubCutaneous three times a day before meals  insulin lispro (HumaLOG) corrective regimen sliding scale   SubCutaneous at bedtime  dextrose 5%. 1000 milliLiter(s) (50 mL/Hr) IV Continuous <Continuous>  dextrose 50% Injectable 12.5 Gram(s) IV Push once  dextrose 50% Injectable 25 Gram(s) IV Push once  dextrose 50% Injectable 25 Gram(s) IV Push once  diltiazem    Tablet 30 milliGRAM(s) Oral every 6 hours  losartan 25 milliGRAM(s) Oral daily  apixaban 5 milliGRAM(s) Oral every 12 hours    MEDICATIONS  (PRN):  dextrose Gel 1 Dose(s) Oral once PRN Blood Glucose LESS THAN 70 milliGRAM(s)/deciliter  glucagon  Injectable 1 milliGRAM(s) IntraMuscular once PRN Glucose LESS THAN 70 milligrams/deciliter          PHYSICAL EXAM:  GENERAL: NAD, well-developed  HEAD:  Atraumatic, Normocephalic  EYES: EOMI, PERRLA, conjunctiva and sclera clear  NECK: Supple, No JVD  CHEST/LUNG: Clear to auscultation bilaterally; No wheeze  HEART: Regular rate and rhythm; No murmurs, rubs, or gallops  ABDOMEN: Soft, Nontender, Nondistended; Bowel sounds present  EXTREMITIES:  2+ Peripheral Pulses, No clubbing, cyanosis, or edema  PSYCH: AAOx3  NEUROLOGY: non-focal  SKIN: No rashes or lesions    LABS:                        15.3   9.7   )-----------( 234      ( 08 Aug 2017 01:46 )             43.7     08-07    142  |  102  |  7   ----------------------------<  156<H>  3.9   |  25  |  0.74    Ca    9.1      07 Aug 2017 14:22    TPro  8.2  /  Alb  4.6  /  TBili  0.9  /  DBili  x   /  AST  28  /  ALT  33  /  AlkPhos  61  08-07    PT/INR - ( 07 Aug 2017 17:23 )   PT: 11.4 sec;   INR: 1.05 ratio         PTT - ( 08 Aug 2017 10:38 )  PTT:137.7 sec  CARDIAC MARKERS ( 07 Aug 2017 17:29 )  x     / <0.01 ng/mL / 57 U/L / x     / 1.4 ng/mL  CARDIAC MARKERS ( 07 Aug 2017 14:22 )  x     / <0.01 ng/mL / 65 U/L / x     / 1.5 ng/mL          RADIOLOGY & ADDITIONAL TESTS:    Imaging Personally Reviewed:    Consultant(s) Notes Reviewed:      Care Discussed with Consultants/Other Providers:

## 2017-12-15 PROCEDURE — 82550 ASSAY OF CK (CPK): CPT

## 2017-12-15 PROCEDURE — 82553 CREATINE MB FRACTION: CPT

## 2017-12-15 PROCEDURE — 99285 EMERGENCY DEPT VISIT HI MDM: CPT | Mod: 25

## 2017-12-15 PROCEDURE — 85027 COMPLETE CBC AUTOMATED: CPT

## 2017-12-15 PROCEDURE — C8929: CPT

## 2017-12-15 PROCEDURE — 96374 THER/PROPH/DIAG INJ IV PUSH: CPT

## 2017-12-15 PROCEDURE — 80053 COMPREHEN METABOLIC PANEL: CPT

## 2017-12-15 PROCEDURE — G0378: CPT

## 2017-12-15 PROCEDURE — 85730 THROMBOPLASTIN TIME PARTIAL: CPT

## 2017-12-15 PROCEDURE — 85610 PROTHROMBIN TIME: CPT

## 2017-12-15 PROCEDURE — 84443 ASSAY THYROID STIM HORMONE: CPT

## 2017-12-15 PROCEDURE — 84484 ASSAY OF TROPONIN QUANT: CPT

## 2017-12-15 PROCEDURE — 83036 HEMOGLOBIN GLYCOSYLATED A1C: CPT

## 2018-12-21 PROBLEM — E11.9 TYPE 2 DIABETES MELLITUS WITHOUT COMPLICATIONS: Chronic | Status: ACTIVE | Noted: 2017-08-07

## 2018-12-21 PROBLEM — I10 ESSENTIAL (PRIMARY) HYPERTENSION: Chronic | Status: ACTIVE | Noted: 2017-08-07

## 2019-01-04 ENCOUNTER — OUTPATIENT (OUTPATIENT)
Dept: OUTPATIENT SERVICES | Facility: HOSPITAL | Age: 59
LOS: 1 days | End: 2019-01-04
Payer: MEDICAID

## 2019-01-04 ENCOUNTER — EMERGENCY (EMERGENCY)
Facility: HOSPITAL | Age: 59
LOS: 1 days | Discharge: HOME | End: 2019-01-04
Attending: EMERGENCY MEDICINE
Payer: MEDICAID

## 2019-01-04 VITALS
OXYGEN SATURATION: 97 % | TEMPERATURE: 98 F | WEIGHT: 253.09 LBS | RESPIRATION RATE: 16 BRPM | SYSTOLIC BLOOD PRESSURE: 160 MMHG | HEIGHT: 71 IN | HEART RATE: 70 BPM | DIASTOLIC BLOOD PRESSURE: 71 MMHG

## 2019-01-04 VITALS
OXYGEN SATURATION: 95 % | TEMPERATURE: 99 F | SYSTOLIC BLOOD PRESSURE: 147 MMHG | HEART RATE: 86 BPM | DIASTOLIC BLOOD PRESSURE: 71 MMHG | RESPIRATION RATE: 18 BRPM

## 2019-01-04 DIAGNOSIS — S82.899A OTHER FRACTURE OF UNSPECIFIED LOWER LEG, INITIAL ENCOUNTER FOR CLOSED FRACTURE: Chronic | ICD-10-CM

## 2019-01-04 DIAGNOSIS — R94.39 ABNORMAL RESULT OF OTHER CARDIOVASCULAR FUNCTION STUDY: ICD-10-CM

## 2019-01-04 LAB
ALBUMIN SERPL ELPH-MCNC: 4.5 G/DL — SIGNIFICANT CHANGE UP (ref 3.3–5)
ALP SERPL-CCNC: 70 U/L — SIGNIFICANT CHANGE UP (ref 40–120)
ALT FLD-CCNC: 17 U/L — SIGNIFICANT CHANGE UP (ref 10–45)
ANION GAP SERPL CALC-SCNC: 14 MMOL/L — SIGNIFICANT CHANGE UP (ref 5–17)
AST SERPL-CCNC: 14 U/L — SIGNIFICANT CHANGE UP (ref 10–40)
BILIRUB SERPL-MCNC: 0.7 MG/DL — SIGNIFICANT CHANGE UP (ref 0.2–1.2)
BUN SERPL-MCNC: 14 MG/DL — SIGNIFICANT CHANGE UP (ref 7–23)
CALCIUM SERPL-MCNC: 9.5 MG/DL — SIGNIFICANT CHANGE UP (ref 8.4–10.5)
CHLORIDE SERPL-SCNC: 100 MMOL/L — SIGNIFICANT CHANGE UP (ref 96–108)
CO2 SERPL-SCNC: 23 MMOL/L — SIGNIFICANT CHANGE UP (ref 22–31)
CREAT SERPL-MCNC: 0.64 MG/DL — SIGNIFICANT CHANGE UP (ref 0.5–1.3)
GLUCOSE BLDC GLUCOMTR-MCNC: 141 MG/DL — HIGH (ref 70–99)
GLUCOSE BLDC GLUCOMTR-MCNC: 168 MG/DL — HIGH (ref 70–99)
GLUCOSE SERPL-MCNC: 178 MG/DL — HIGH (ref 70–99)
HCT VFR BLD CALC: 46.1 % — HIGH (ref 34.5–45)
HGB BLD-MCNC: 15.9 G/DL — HIGH (ref 11.5–15.5)
MCHC RBC-ENTMCNC: 30.2 PG — SIGNIFICANT CHANGE UP (ref 27–34)
MCHC RBC-ENTMCNC: 34.6 GM/DL — SIGNIFICANT CHANGE UP (ref 32–36)
MCV RBC AUTO: 87.4 FL — SIGNIFICANT CHANGE UP (ref 80–100)
PLATELET # BLD AUTO: 250 K/UL — SIGNIFICANT CHANGE UP (ref 150–400)
POTASSIUM SERPL-MCNC: 3.9 MMOL/L — SIGNIFICANT CHANGE UP (ref 3.5–5.3)
POTASSIUM SERPL-SCNC: 3.9 MMOL/L — SIGNIFICANT CHANGE UP (ref 3.5–5.3)
PROT SERPL-MCNC: 8.3 G/DL — SIGNIFICANT CHANGE UP (ref 6–8.3)
RBC # BLD: 5.28 M/UL — HIGH (ref 3.8–5.2)
RBC # FLD: 11.6 % — SIGNIFICANT CHANGE UP (ref 10.3–14.5)
SODIUM SERPL-SCNC: 137 MMOL/L — SIGNIFICANT CHANGE UP (ref 135–145)
WBC # BLD: 8.4 K/UL — SIGNIFICANT CHANGE UP (ref 3.8–10.5)
WBC # FLD AUTO: 8.4 K/UL — SIGNIFICANT CHANGE UP (ref 3.8–10.5)

## 2019-01-04 PROCEDURE — 93458 L HRT ARTERY/VENTRICLE ANGIO: CPT | Mod: 26,GC

## 2019-01-04 PROCEDURE — 82962 GLUCOSE BLOOD TEST: CPT

## 2019-01-04 PROCEDURE — 99152 MOD SED SAME PHYS/QHP 5/>YRS: CPT | Mod: GC

## 2019-01-04 PROCEDURE — 85027 COMPLETE CBC AUTOMATED: CPT

## 2019-01-04 PROCEDURE — 99283 EMERGENCY DEPT VISIT LOW MDM: CPT

## 2019-01-04 PROCEDURE — 93010 ELECTROCARDIOGRAM REPORT: CPT

## 2019-01-04 PROCEDURE — 93567 NJX CAR CTH SPRVLV AORTGRPHY: CPT

## 2019-01-04 PROCEDURE — 99282 EMERGENCY DEPT VISIT SF MDM: CPT

## 2019-01-04 PROCEDURE — C1894: CPT

## 2019-01-04 PROCEDURE — 93458 L HRT ARTERY/VENTRICLE ANGIO: CPT

## 2019-01-04 PROCEDURE — 80053 COMPREHEN METABOLIC PANEL: CPT

## 2019-01-04 PROCEDURE — 99152 MOD SED SAME PHYS/QHP 5/>YRS: CPT

## 2019-01-04 PROCEDURE — 93567 NJX CAR CTH SPRVLV AORTGRPHY: CPT | Mod: GC

## 2019-01-04 PROCEDURE — C1769: CPT

## 2019-01-04 PROCEDURE — C1887: CPT

## 2019-01-04 PROCEDURE — 93005 ELECTROCARDIOGRAM TRACING: CPT

## 2019-01-04 RX ORDER — LOSARTAN POTASSIUM 100 MG/1
1 TABLET, FILM COATED ORAL
Qty: 0 | Refills: 0 | COMMUNITY

## 2019-01-04 RX ORDER — METFORMIN HYDROCHLORIDE 850 MG/1
1 TABLET ORAL
Qty: 0 | Refills: 0 | COMMUNITY

## 2019-01-04 RX ORDER — SODIUM CHLORIDE 9 MG/ML
3 INJECTION INTRAMUSCULAR; INTRAVENOUS; SUBCUTANEOUS EVERY 8 HOURS
Qty: 0 | Refills: 0 | Status: DISCONTINUED | OUTPATIENT
Start: 2019-01-04 | End: 2019-01-19

## 2019-01-04 RX ORDER — PREGABALIN 225 MG/1
1 CAPSULE ORAL
Qty: 0 | Refills: 0 | COMMUNITY

## 2019-01-04 RX ORDER — CHOLECALCIFEROL (VITAMIN D3) 125 MCG
1 CAPSULE ORAL
Qty: 0 | Refills: 0 | COMMUNITY

## 2019-01-04 RX ORDER — DILTIAZEM HCL 120 MG
1 CAPSULE, EXT RELEASE 24 HR ORAL
Qty: 0 | Refills: 0 | COMMUNITY

## 2019-01-04 NOTE — ED PROVIDER NOTE - OBJECTIVE STATEMENT
58 F s/p cardiac cath via R radial this AM, returns for bleeding from site. Pressure applied and bleeding has slowed down. 58 F s/p cardiac cath via R radial this AM, returns for bleeding from site. Pressure applied and bleeding has slowed down.  **ATTENDING OBJECTIVE STATEMENT (Dr. Joe Osuna): I have reviewed this note, the presenting symptoms, and the Chief Complaint and the History of Present Illness as documented, with the other care provider(s) and nurses on the patient care team. I have also reviewed this patient's past medical/surgical history and social/family history as reviewed and listed in this electronic medical record. Patient is a 58-year-old woman with the chief complaint of bleeding from right radial artery catheterization site. Reports she had a diagnostic cardiac catheterization earlier today at [facility], was discharged following procedure, and was ready to leave the premises when her dressing moved and she developed acute hemorrhage. NO syncope, distal discoloration, numbness, tingling, weakness, or paresthesias. NO focal or generalized weakness. NO chest pain, palpitations, shortness of breath, dyspnea on exertion, or vasovagal prodrome. s/p anticoagulants during the procedure. Hemorrhage controlled in Triage/ED via direct pressure (gauze bandage pressure dressing applied). Here for evaluation. VAS 1-2/10.

## 2019-01-04 NOTE — ED PROVIDER NOTE - UPPER EXTREMITY EXAM, RIGHT
BRUISING/**ATTENDING ADDENDUM (Dr. Joe Osuna): POSITIVE minimal bruising proximate to puncture (small area). NO numbness, tingling, weakness, or paresthesias. full range of motion of all fingers, wrist and forearm without pain.

## 2019-01-04 NOTE — ED PROVIDER NOTE - CHIEF COMPLAINT
The patient is a 58y Female complaining of The patient is a 58y Female complaining of bleeding from wrist. The patient is a 58y Female complaining of bleeding from right wrist catheterization site.

## 2019-01-04 NOTE — ED PROVIDER NOTE - EXTREMITY EXAM
right upper extremity findings/**ATTENDING ADDENDUM (Dr. Joe Osuna): puncture site (right radial artery access) at the volar aspect of the right distal wrist proximate to the distal radius noted. POSITIVE minimal ecchymosis noted. NO active hemorrhage at site. NO motor deficits.

## 2019-01-04 NOTE — H&P CARDIOLOGY - HISTORY OF PRESENT ILLNESS
58 year old  female with PMH of HTN, A Fib - on Eliquis 58 year old  female with PMH of HTN, A Fib - on Eliquis (last dose 12/31/18), obesity, DMT2- last A1c 7.7, followed by PCP Dr Lynda Amanda, complicated by peripheral neuropathy, presents today for cardiac catheterization. Patient reports she  had recent follow up with cardiologist Dr Jakob Amanda who performed NST showing inferoapical ischemia. EF 67%. Referred today for angiogram . Asymptomatic.

## 2019-01-04 NOTE — CHART NOTE - NSCHARTNOTEFT_GEN_A_CORE
EMERGENCY ROOM : RN requesting assist with discharge transport. LMSW met with patient at the bedside. Patient reports scheduled CATH Lab appointment earlier. Patient reports return transportation was scheduled with [employer] for 6:30pm. However, scheduled taxi left as patient reported to the ED for bleeding from right wrist. Patient requesting assist with scheduling discharge transport. Patient with [facility] Medicaid (policy #[id number]). MAS contacted to schedule discharge transportation. Patient scheduled to travel with [employer] (ph. [phone]). Invoice #[id number]. Patient provided with written transportation details.

## 2019-01-04 NOTE — ED PROVIDER NOTE - VASCULAR COMPROMISE
**ATTENDING ADDENDUM (Dr. Joe Osuna): NO distal discoloration. NO erythema, pallor, warmth, or severe soft-tissue swelling noted. NO pulsatile hemorrhage./no vascular compromise

## 2019-01-04 NOTE — ED PROVIDER NOTE - CONDUCTED A DETAILED DISCUSSION WITH PATIENT AND/OR GUARDIAN REGARDING, MDM
return to ED if symptoms worsen, persist or questions arise/need for outpatient follow-up/**ATTENDING ADDENDUM (Dr. oJe Osuan): Anticipatory guidance provided.

## 2019-01-04 NOTE — ED PROVIDER NOTE - CARE PLAN
Principal Discharge DX:	Bleeding  Goal:	**ATTENDING ADDENDUM (Dr. Joe Osuna): Goals of care include resolution of emergent/urgent symptoms and concerns, and restoration to baseline level of homeostasis. Principal Discharge DX:	Bleeding  Goal:	**ATTENDING ADDENDUM (Dr. Joe Osuna): Goals of care include resolution of emergent/urgent symptoms and concerns, and restoration to baseline level of homeostasis.  Secondary Diagnosis:	Complication associated with peripherally inserted central catheter, initial encounter

## 2019-01-04 NOTE — ED ADULT NURSE NOTE - NSIMPLEMENTINTERV_GEN_ALL_ED
Implemented All Fall with Harm Risk Interventions:  [place] to call system. Call bell, personal items and telephone within reach. Instruct patient to call for assistance. Room bathroom lighting operational. Non-slip footwear when patient is off stretcher. Physically safe environment: no spills, clutter or unnecessary equipment. Stretcher in lowest position, wheels locked, appropriate side rails in place. Provide visual cue, wrist band, yellow gown, etc. Monitor gait and stability. Monitor for mental status changes and reorient to person, place, and time. Review medications for side effects contributing to fall risk. Reinforce activity limits and safety measures with patient and family. Provide visual clues: red socks.

## 2019-01-04 NOTE — ED ADULT NURSE NOTE - OBJECTIVE STATEMENT
58 y f came to the ed c/o bleeding from the site of her cardiac cath. patient states she was just d/c after having a cardiac cath and the site on her right wrist started to bleed. patient states she was getting in the cab when the blood started to spurt out. patient applied pressure and came to the area. site is no long bleeding as heavy as when it first started as per patient. able to move all extremities. no numbness or tingling.

## 2019-01-04 NOTE — ED PROVIDER NOTE - MEDICAL DECISION MAKING DETAILS
**ATTENDING MEDICAL DECISION MAKING/SYNTHESIS (Dr. Joe Osuna): I have reviewed the Chief Complaint, the HPI, the ROS, and have directly performed and confirmed the findings on the Physical Examination. I have reviewed the medical decision making with all providers, as applicable. The PROBLEM REPRESENTATION at this time is: 58-year-old woman (right-hand dominant) with history of Diabetes Mellitus, Hypertension, and hyperlipidemia s/p diagnostic cardiac catheterization earlier today with transient post-procedure hemorrhage at catheterization access site on the volar right wrist (right radial a.), now resolved with direct pressure and time. The MOST LIKELY DIAGNOSIS, and the LIST OF DIFFERENTIAL DIAGNOSES, includes (but is not limited to) the following: arterial injury (other than puncture, other injury e.g. dissection, pseudoaneurysm, or equivalent is unlikely), neurapraxia (NO evidence), muscle injury/sprain/strain (NO evidence), foreign body (NO evidence), serious bacterial infection or sepsis/severe sepsis e.g. cellulitis, necrotizing fasciitis, compartment syndrome or equivalent (NO evidence). other inflammatory disorder (NO evidence), coagulopathy (iatrogenic - from therapeutics - or other etiology; considered, but hemostasis achieved quickly; unlikely). The likelihood of each of these diagnoses has been appropriately considered in the context of this patient's presentation and my evaluation. PLAN: as described in EMR, including diagnostics, therapeutics and consultation as clinically warranted. I will continue to reevaluate the patient, including the results of all testing, and monitor response to therapy throughout the patient's course in the ED.

## 2019-01-04 NOTE — ED PROVIDER NOTE - PROGRESS NOTE DETAILS
**ATTENDING ADDENDUM (Dr. Joe Osuna): patient serially evaluated throughout ED course. NO acute deterioration up to this time in the ED. In fact, right upper extremity procedure site is clean, dry and intact. Pressure dressing replaced after physical examination. Will continue to observe and monitor closely. Anticipatory guidance provided. **ATTENDING ADDENDUM (Dr. Joe Osuna): patient serially evaluated throughout ED course. NO acute deterioration up to this time in the ED. In fact, right upper extremity procedure site is clean, dry and intact. Pressure dressing replaced after physical examination. Will continue to observe and monitor closely. Anticipatory guidance provided. NO evidence of obvious wound dehiscence, infection, acute aneurysmal dilation, or other worrisome post-procedure complication warranting admission or consultation. Agree with discharge home with close outpatient followup with primary care physician/provider.

## 2019-01-04 NOTE — ED PROVIDER NOTE - PHYSICAL EXAMINATION
**ATTENDING ADDENDUM (Dr. Joe Osuna): I have reviewed and substantially contributed to the elements of the PE as documented above. I have directly performed an examination of this patient in conjunction with the other members (EM resident/PA/NP) of the patient care team.

## 2019-01-04 NOTE — ED PROVIDER NOTE - SKIN [+], MLM
bleeding from R radial aretrty **ATTENDING ADDENDUM (Dr. Joe Osuna): POSITIVE bleeding from R radial artery puncture site (site of catheterization; hemorrhage now resolved)

## 2019-01-04 NOTE — ED PROVIDER NOTE - NSFOLLOWUPINSTRUCTIONS_ED_ALL_ED_FT
(1) anticipatory guidance provided  (2) rest  (3) outpatient follow-up with your primary care physician/provider (4) return if symptoms worsen, persist, or do not resolve (5) medications, if indicated, as prescribed (stop metformin as instructed).

## 2019-01-04 NOTE — ED PROVIDER NOTE - ATTENDING CONTRIBUTION TO CARE
**ATTENDING ADDENDUM (Dr. Joe sOuna): I attest that I have directly examined this patient and reviewed and formulated the diagnostic and therapeutic management plan in collaboration with the EM resident. Please see MDM note and remainder of EMR for findings from CC, HPI, ROS, and PE. (Morad)

## 2019-01-04 NOTE — ED PROVIDER NOTE - CHPI ED SYMPTOMS POS
BLEEDING AT SITE/**ATTENDING ADDENDUM (Dr. Joe Osuna): bleeding resolved at time of attending evaluation

## 2019-01-04 NOTE — ED PROVIDER NOTE - PLAN OF CARE
**ATTENDING ADDENDUM (Dr. Joe Osuna): Goals of care include resolution of emergent/urgent symptoms and concerns, and restoration to baseline level of homeostasis.

## 2021-05-24 ENCOUNTER — EMERGENCY (EMERGENCY)
Facility: HOSPITAL | Age: 61
LOS: 1 days | Discharge: HOME | End: 2021-05-24
Attending: EMERGENCY MEDICINE
Payer: MEDICAID

## 2021-05-24 VITALS
SYSTOLIC BLOOD PRESSURE: 146 MMHG | OXYGEN SATURATION: 95 % | RESPIRATION RATE: 19 BRPM | DIASTOLIC BLOOD PRESSURE: 63 MMHG | HEART RATE: 81 BPM

## 2021-05-24 VITALS
HEIGHT: 71 IN | WEIGHT: 244.93 LBS | RESPIRATION RATE: 20 BRPM | HEART RATE: 92 BPM | DIASTOLIC BLOOD PRESSURE: 71 MMHG | TEMPERATURE: 99 F | SYSTOLIC BLOOD PRESSURE: 148 MMHG | OXYGEN SATURATION: 98 %

## 2021-05-24 DIAGNOSIS — S82.899A OTHER FRACTURE OF UNSPECIFIED LOWER LEG, INITIAL ENCOUNTER FOR CLOSED FRACTURE: Chronic | ICD-10-CM

## 2021-05-24 PROBLEM — G62.9 POLYNEUROPATHY, UNSPECIFIED: Chronic | Status: ACTIVE | Noted: 2019-01-04

## 2021-05-24 PROBLEM — I48.91 UNSPECIFIED ATRIAL FIBRILLATION: Chronic | Status: ACTIVE | Noted: 2019-01-04

## 2021-05-24 PROBLEM — E66.9 OBESITY, UNSPECIFIED: Chronic | Status: ACTIVE | Noted: 2019-01-04

## 2021-05-24 PROCEDURE — 99283 EMERGENCY DEPT VISIT LOW MDM: CPT | Mod: 25

## 2021-05-24 PROCEDURE — 99284 EMERGENCY DEPT VISIT MOD MDM: CPT

## 2021-05-24 PROCEDURE — 73610 X-RAY EXAM OF ANKLE: CPT | Mod: 26,RT

## 2021-05-24 PROCEDURE — 73610 X-RAY EXAM OF ANKLE: CPT

## 2021-05-24 NOTE — ED PROVIDER NOTE - NSFOLLOWUPINSTRUCTIONS_ED_ALL_ED_FT
You were seen in the Emergency Department for ankle hardware check. Your xrays were normal. Please follow up with your normal doctors and monitor your symptoms.      1) Advance activity as tolerated.   2) Continue all previously prescribed medications as directed.    3) Follow up with your primary care physician in 24-48 hours - take copies of your results.    4) Return to the Emergency Department for worsening or persistent symptoms, and/or ANY NEW OR CONCERNING SYMPTOMS. You were seen in the Emergency Department for ankle hardware check. Your xrays showed ___    Please call later today to set up an appointment with Dr. Tariq of Orthopedics for follow up in the next 1-2 weeks.    Please follow up with your normal doctors and monitor your symptoms.      1.  You may continue with activity as tolerated.  2) Continue all previously prescribed medications as directed.    3) Follow up with your primary care physician in 24-48 hours - take copies of your results.    4) Return to the Emergency Department for worsening or persistent symptoms, and/or ANY NEW OR CONCERNING SYMPTOMS. You were seen in the Emergency Department for ankle hardware check. Your xrays showed that you have a screw that is 0.6cm backed out. There are no interventions for this right now.     Please call later today to set up an appointment with Dr. Tariq of Orthopedics for follow up in the next 1-2 weeks.    Please follow up with your normal doctors and monitor your symptoms.      1.  You may continue with activity as tolerated.  2) Continue all previously prescribed medications as directed.    3) Follow up with your primary care physician in 24-48 hours - take copies of your results.    4) Return to the Emergency Department for worsening or persistent symptoms, and/or ANY NEW OR CONCERNING SYMPTOMS.

## 2021-05-24 NOTE — ED PROVIDER NOTE - OBJECTIVE STATEMENT
59yo F hx of afib, HTN, ORIF of R ankle in 2000 at [facility] comes to ED for ankle hardware check. Pt states she feels like the screw in her ankle has "moved," noticed 2 days ago. No injury, popping sensation, instability, or falls. Denies issues with ambulating, pain, or decreased ROM. Pt denies other symptoms. States that she has been walking more, lost weight including in ankles.

## 2021-05-24 NOTE — ED PROVIDER NOTE - ATTENDING CONTRIBUTION TO CARE
Attending MD Herrera: I personally have seen and examined this patient.  Resident note reviewed and agree on plan of care and except where noted.  See below for details.     Seen in [place]  ALlergies: Cortisone, PRednisone, Tetracycline    60F with PMH/PSH including DM on Metformin, AFib on ELiquis, DIltiazem, HTN on Losartan, HCTZ, s/p fracture in 11/2000 presents to the ED with "screw poking out" of R ankle.  Reports that she had repair for broken R ankle 21 yrs ago.  Reports yesterday was "doing her feet" i.e cutting her nails when she noticed that there was an area on the R medial ankle where it seemed there was a screw poking out.  Denies pain, trauma, fall, inability to ambulate.  Denies any other physical complaints including chest pain, shortness of breath, abdominal pain, nausea, vomiting, diarrhea, fevers, chills, cough, COVID, urinary complaints.  A ten (10) point review of systems was negative other than as stated in the HPI or elsewhere in the chart.     Exam:   General: NAD  HENT: head NCAT, airway patent   Eyes: PERRL  Lungs: lungs CTAB with good inspiratory effort, no wheezing, no rhonchi, no rales  Cardiac: +S1S2, no m/r/g  GI: abdomen soft with +BS, NT, ND  MSK: FROM at neck, +well healed surgical incision at R ankle, +R medial ankle with palpable fb, no overlying skin changes, including skin break, ecchymosis, nontender, no erythema  Neuro: moving all extremities with 5/5 strength bilateral upper and lower extremities, sensory grossly intact, no focal neuro deficits  Psych: normal mood and affect     A/P: 60F with known R ankle sx, no pain, now with noted likely hardware, will obtain XR, if nonactionable, outpt ortho follow up, has not seen orthopedist in 21 yrs and will need referral as per patient Attending MD Herrera: I personally have seen and examined this patient.  Resident note reviewed and agree on plan of care and except where noted.  See below for details.     Seen in [place] 59  ALlergies: Cortisone, PRednisone, Tetracycline  PMD Lynda Amanda MD  Cards Dr. Jakob Amanda    60F with PMH/PSH including DM on Metformin, AFib on ELiquis, DIltiazem, HTN on Losartan, HCTZ, s/p fracture in 11/2000 presents to the ED with "screw poking out" of R ankle.  Reports that she had repair for broken R ankle 21 yrs ago.  Reports yesterday was "doing her feet" i.e cutting her nails when she noticed that there was an area on the R medial ankle where it seemed there was a screw poking out.  Denies pain, trauma, fall, inability to ambulate.  Denies any other physical complaints including chest pain, shortness of breath, abdominal pain, nausea, vomiting, diarrhea, fevers, chills, cough, COVID, urinary complaints.  A ten (10) point review of systems was negative other than as stated in the HPI or elsewhere in the chart.     Exam:   General: NAD  HENT: head NCAT, airway patent   Eyes: PERRL  Lungs: lungs CTAB with good inspiratory effort, no wheezing, no rhonchi, no rales  Cardiac: +S1S2, no m/r/g  GI: abdomen soft with +BS, NT, ND  MSK: FROM at neck, +well healed surgical incision at R ankle, +R medial ankle with palpable fb, no overlying skin changes, including skin break, ecchymosis, nontender, no erythema  Neuro: moving all extremities with 5/5 strength bilateral upper and lower extremities, sensory grossly intact, no focal neuro deficits  Psych: normal mood and affect     A/P: 60F with known R ankle sx, no pain, now with noted likely hardware, will obtain XR, if nonactionable, outpt ortho follow up, has not seen orthopedist in 21 yrs and will need referral as per patient

## 2021-05-24 NOTE — ED PROVIDER NOTE - CLINICAL SUMMARY MEDICAL DECISION MAKING FREE TEXT BOX
Pt w/ ORIF 21 years ago here for hardware check. VSS. Exam unremarkable, stable ankle. ROM intact. Xrays, likely dc.

## 2021-05-24 NOTE — ED PROVIDER NOTE - NS ED ROS FT
Constitutional: no fevers, chills  HEENT: no cough, rhinorrhea  Cardiac: no chest pain, palpitations  Respiratory: no SOB  GI: no n/v, abd pain, bloody or dark stools  : no dysuria, frequency, or hematuria  MSK: no joint pain  Skin: no rashes  Neuro: no headache, change in vision, weakness  Psych: negative

## 2021-05-24 NOTE — ED PROVIDER NOTE - PATIENT PORTAL LINK FT
You can access the FollowMyHealth Patient Portal offered by [facility] by registering at the following website: [email or web address]. By joining [facility]’s FollowMyHealth portal, you will also be able to view your health information using other applications (apps) compatible with our system.

## 2021-05-24 NOTE — ED PROVIDER NOTE - CARE PROVIDER_API CALL
Mena Tariq  ORTHOPAEDIC SURGERY  [address] [place] [address], [address]  [place] [address]  Phone: [phone]  Fax: [phone]  Follow Up Time: 4-6 Days

## 2021-05-24 NOTE — ED PROVIDER NOTE - PROGRESS NOTE DETAILS
Attending MD Herrera: Spoke with Luisa, reviewed XR, no acute orhto intervention, no weight bearing restrictions, patient tofollow up w Marciano MELISSA as outpt. STable for dc.  Follow up instructions given, importance of follow up emphasized, return to ED parameters reviewed and patient verbalized understanding.  All questions answered, all concerns addressed.

## 2021-05-29 NOTE — H&P ADULT - NSHPSOCIALHISTORY_GEN_ALL_CORE
Social History:    Marital Status:  (   )    ( x  ) Single    (   )    (  )   Occupation:   Lives with: ( x ) alone  (  ) children   (  ) spouse   (  ) parents  (  ) other    Substance Use (street drugs): (  ) never used  (  ) other:  Tobacco Usage:  (  x ) never smoked   (   ) former smoker   (   ) current smoker  (     ) pack years  (        ) last cigarette date  Alcohol Usage: denies    (     ) Advanced Directives: (     ) None    (      ) DNR    (     ) DNI    (     ) Health Care Proxy:
[phone]

## 2023-04-17 NOTE — PROVIDER CONTACT NOTE (CRITICAL VALUE NOTIFICATION) - SITUATION
General Sunscreen Counseling: I recommended a broad spectrum sunscreen with at least SPF of 30, but higher is better.  I explained that SPF 30 sunscreens block approximately 97 percent of the sun's harmful rays in vitro, but in practice a higher SPF offers more protection from sun exposure as most people don't use the density of application to get the number on the body.  Sunscreens should be applied at least 15 minutes prior to expected sun exposure and then every 2 hours after that as long as sun exposure continues. If swimming or exercising sunscreen should be reapplied more frequently.  One ounce, or 30 fluid ounces (the equivalent of a shot glass full of sunscreen), is adequate to protect the skin not covered by a bathing suit. I also recommended a lip balm with a sunscreen as well. Sun protective clothing (UPF50+) can be used in lieu of sunscreen but must be worn the entire time you are exposed to the sun's rays. Products Recommended: Recommend SPF 50 or greater for face, 30 or greater for body\\nRecommend mineral sunscreen in all cases (active ingredients zinc oxide and/or titanium dioxide)\\nBrands: Neutrogena, Tizo, La Roche Posay, Elta MD Pt is on a heparin gtt, STAT aPTT resulted >200. Heparin nomogram followed heparin gtt delayed at this time for 60 minutes as per protocol, will restart heparin gtt at 16ml/hr. Detail Level: Simple

## 2023-04-21 NOTE — ED ADULT NURSE NOTE - NURSING ED SKIN COLOR
Mom called in regards to some questions that she has on what she stated might be some venlafaxine side effects. She was advised that Dr. Marcela Abebe is out of the office until Monday morning but she said that it is non-urgent.      Callback number: [phone] normal for race

## 2024-09-17 NOTE — ED ADULT TRIAGE NOTE - RESPIRATORY RATE (BREATHS/MIN)
[de-identified] : Patient does have tenderness palpation on the fifth metacarpal neck.  There is some deformity that is present.  There is a mild extensor lag present.  No rotational abnormality. 16